# Patient Record
(demographics unavailable — no encounter records)

---

## 2025-01-08 NOTE — SOCIAL HISTORY
[House] : [unfilled] lives in a house  [Central Forced Air] : heating provided by central forced air [Window Units] : air conditioning provided by window units [Humidifier] : uses a humidifier [None] : none [Dehumidifier] : does not use a dehumidifier [Cockroaches] : Patient states that there are no cockroaches in the home [Dust Mite Covers] : does not have dust mite covers [Feather Pillows] : does not have feather pillows [Feather Comforter] : does not have a feather comforter [Bedroom] : not in the bedroom [Basement] : not in the basement [Living Area] : not in the living area [Smokers in Household] : there are no smokers in the home [de-identified] : n/a [de-identified] : n/a

## 2025-01-08 NOTE — HISTORY OF PRESENT ILLNESS
[Asthma] : asthma [Allergic Rhinitis] : allergic rhinitis [Eczematous rashes] : eczematous rashes [Venom Reactions] : venom reactions [Food Allergies] : food allergies [Drug Allergies] : drug allergies [de-identified] : KENNEDI VEGA  is a 7 month old female who was introduced to [employer] banana oatmeal, mom was not aware that it contained milk, within half an hour she was throwing up nonstop, mom noticed she could not hold her head up and her eyes were rolling back, she took her to the ER, but were unable to rule out food allergies, she was advised to follow up with an allergist. Mom says she has been introduced to good and gather baby food, and scrambled eggs, she has never experienced a reaction, but has not introduced to nuts and shellfish, since the reaction she has not introduced baked goods. She wants to know if this reaction is food allergy related or not, mom says her stomach is always dry, she applies Aquaphor but it does not help she wants to know if this is eczema. She is here today to know how to better treat her symptoms.

## 2025-01-08 NOTE — ASSESSMENT
[FreeTextEntry1] : 1. ?FA -? FPIES -will hold off on dairy until 1 year of age, but she can continue to try other foods.